# Patient Record
Sex: FEMALE | URBAN - METROPOLITAN AREA
[De-identification: names, ages, dates, MRNs, and addresses within clinical notes are randomized per-mention and may not be internally consistent; named-entity substitution may affect disease eponyms.]

---

## 2021-11-28 ENCOUNTER — NURSE TRIAGE (OUTPATIENT)
Dept: NURSING | Facility: CLINIC | Age: 31
End: 2021-11-28

## 2021-11-29 NOTE — TELEPHONE ENCOUNTER
"States she is calling about a vaginal problem. Not seen in our system before. When asked to describe problem she said \"I like to play with it too much\". Told caller this is non-urgent and should be discussed w/ a doctor during regular clinic hours.   "